# Patient Record
Sex: FEMALE | Race: WHITE | NOT HISPANIC OR LATINO
[De-identification: names, ages, dates, MRNs, and addresses within clinical notes are randomized per-mention and may not be internally consistent; named-entity substitution may affect disease eponyms.]

---

## 2018-09-05 ENCOUNTER — RESULT REVIEW (OUTPATIENT)
Age: 58
End: 2018-09-05

## 2020-05-05 ENCOUNTER — RESULT REVIEW (OUTPATIENT)
Age: 60
End: 2020-05-05

## 2021-12-28 ENCOUNTER — TRANSCRIPTION ENCOUNTER (OUTPATIENT)
Age: 61
End: 2021-12-28

## 2023-07-03 RX ORDER — NEBIVOLOL 5 MG/1
5 TABLET ORAL
Refills: 0 | Status: ACTIVE | COMMUNITY

## 2023-07-06 ENCOUNTER — APPOINTMENT (OUTPATIENT)
Dept: OBGYN | Facility: CLINIC | Age: 63
End: 2023-07-06
Payer: COMMERCIAL

## 2023-07-06 VITALS — BODY MASS INDEX: 22.68 KG/M2 | SYSTOLIC BLOOD PRESSURE: 112 MMHG | WEIGHT: 124 LBS | DIASTOLIC BLOOD PRESSURE: 64 MMHG

## 2023-07-06 DIAGNOSIS — R87.612 LOW GRADE SQUAMOUS INTRAEPITHELIAL LESION ON CYTOLOGIC SMEAR OF CERVIX (LGSIL): ICD-10-CM

## 2023-07-06 PROCEDURE — 99204 OFFICE O/P NEW MOD 45 MIN: CPT | Mod: 25

## 2023-07-06 PROCEDURE — 57454 BX/CURETT OF CERVIX W/SCOPE: CPT

## 2023-07-06 NOTE — PLAN
[FreeTextEntry1] : I recommended that she call for the results of her ECC in 1 week.  If the ECC shows HSIL, she will need a LEEP.  If the ECC shows BULMARO-1 or negative, she should return in 1 year for a repeat evaluation.